# Patient Record
Sex: MALE
[De-identification: names, ages, dates, MRNs, and addresses within clinical notes are randomized per-mention and may not be internally consistent; named-entity substitution may affect disease eponyms.]

---

## 2021-06-30 PROBLEM — Z00.00 ENCOUNTER FOR PREVENTIVE HEALTH EXAMINATION: Status: ACTIVE | Noted: 2021-06-30

## 2021-07-10 ENCOUNTER — APPOINTMENT (OUTPATIENT)
Dept: ORTHOPEDIC SURGERY | Facility: CLINIC | Age: 46
End: 2021-07-10
Payer: MEDICAID

## 2021-07-10 ENCOUNTER — NON-APPOINTMENT (OUTPATIENT)
Age: 46
End: 2021-07-10

## 2021-07-10 VITALS
WEIGHT: 220 LBS | DIASTOLIC BLOOD PRESSURE: 82 MMHG | HEIGHT: 73 IN | BODY MASS INDEX: 29.16 KG/M2 | SYSTOLIC BLOOD PRESSURE: 129 MMHG | HEART RATE: 59 BPM

## 2021-07-10 DIAGNOSIS — Z78.9 OTHER SPECIFIED HEALTH STATUS: ICD-10-CM

## 2021-07-10 DIAGNOSIS — G56.03 CARPAL TUNNEL SYNDROM,BILATERAL UPPER LIMBS: ICD-10-CM

## 2021-07-10 DIAGNOSIS — Z80.0 FAMILY HISTORY OF MALIGNANT NEOPLASM OF DIGESTIVE ORGANS: ICD-10-CM

## 2021-07-10 DIAGNOSIS — F17.200 NICOTINE DEPENDENCE, UNSPECIFIED, UNCOMPLICATED: ICD-10-CM

## 2021-07-10 PROCEDURE — 99202 OFFICE O/P NEW SF 15 MIN: CPT

## 2021-07-10 PROCEDURE — 73130 X-RAY EXAM OF HAND: CPT | Mod: 50

## 2021-07-10 RX ORDER — DICLOFENAC SODIUM 50 MG/1
50 TABLET, DELAYED RELEASE ORAL TWICE DAILY
Qty: 60 | Refills: 0 | Status: ACTIVE | COMMUNITY
Start: 2021-07-10 | End: 1900-01-01

## 2021-07-10 NOTE — PHYSICAL EXAM
[de-identified] : Bilateral Hand/Fingers: No obvious deformities, atrophy, ecchymosis, or swelling/effusion. No signs of paronychia, felon, or Kanavel's four cardinal signs. Negative tenderness over the metacarpals, phalanges, MCP joints, PIP joints, DIP joints, and thenar/hypothenar eminences. Normal active and passive range of motion, including flexion and extension of the MCP, PIP, and DIP joints. Positive Tinel's and Phalen's. Neurovascular status is intact.\par \par Otherwise, no apparent distress. Alert and oriented x 3. Normal mood and affect. No atrophy or swelling. 5 out of 5 motor strength. Sensation is intact and symmetrical. Normal deep tendon reflexes. Full range of motion of shoulder, elbows, hips, and knees (flexion, extension, and rotation). No palpatory tenderness or palpable lymph nodes. Negative straight leg raise. Normal finger-to-nose test. No pathological reflexes. Normal ambulation. No upper or lower extremity instability. [de-identified] : 3 views of bilateral hands are negative for any obvious fractures or dislocations. Incidentally noted loose body in the dorsal aspect of the left index finger posterior and proximal to the nail bed. The patient understands that this is a wet read and I am not a radiologist. If the final read reveals something different than discussed in the office, then the patient will get a call back in the next 24 - 48 hours to discuss.\par

## 2021-07-10 NOTE — HISTORY OF PRESENT ILLNESS
[de-identified] : Mr. ALEXA LAZAR is a 46 year male who presents to office complaining of bilateral hand pain x 3-4 months.\par Patient is RHD. Denies specific injury, trauma, or fall. 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 They were reviewed by me today with the patient and documented accordingly.

## 2021-07-10 NOTE — DISCUSSION/SUMMARY
[de-identified] : Diclofenac 50 mg prescribed\par Physical Therapy ordered\par Educated on wearing night splints\par Educated on following up with another urgent care with necessary equipment/ER to evaluate the loose body in his left index finger for removal\par Follow up with Dr. Conway in 4-6 weeks if main issue persists\par Consider NCV/EMG at that time\par All options discussed with patient\par All questions by patient answered to their satisfaction\par Patient expresses full understanding and agreement with plan\par Patient is happy with the office visit\par

## 2021-07-13 RX ORDER — DICLOFENAC SODIUM 50 MG/1
50 TABLET, DELAYED RELEASE ORAL TWICE DAILY
Qty: 60 | Refills: 0 | Status: ACTIVE | COMMUNITY
Start: 2021-07-13 | End: 1900-01-01

## 2021-07-15 PROBLEM — G56.03 BILATERAL CARPAL TUNNEL SYNDROME: Status: ACTIVE | Noted: 2021-07-10

## 2021-08-23 ENCOUNTER — RX RENEWAL (OUTPATIENT)
Age: 46
End: 2021-08-23